# Patient Record
Sex: MALE | Race: BLACK OR AFRICAN AMERICAN | NOT HISPANIC OR LATINO | Employment: FULL TIME | URBAN - METROPOLITAN AREA
[De-identification: names, ages, dates, MRNs, and addresses within clinical notes are randomized per-mention and may not be internally consistent; named-entity substitution may affect disease eponyms.]

---

## 2024-12-29 ENCOUNTER — OFFICE VISIT (OUTPATIENT)
Dept: URGENT CARE | Facility: CLINIC | Age: 59
End: 2024-12-29
Payer: COMMERCIAL

## 2024-12-29 VITALS
OXYGEN SATURATION: 96 % | WEIGHT: 240.4 LBS | HEIGHT: 72 IN | TEMPERATURE: 98.7 F | SYSTOLIC BLOOD PRESSURE: 124 MMHG | HEART RATE: 93 BPM | DIASTOLIC BLOOD PRESSURE: 89 MMHG | BODY MASS INDEX: 32.56 KG/M2

## 2024-12-29 DIAGNOSIS — J06.9 VIRAL UPPER RESPIRATORY TRACT INFECTION: Primary | ICD-10-CM

## 2024-12-29 DIAGNOSIS — R06.02 SOB (SHORTNESS OF BREATH) ON EXERTION: ICD-10-CM

## 2024-12-29 PROBLEM — I26.99 PE (PULMONARY THROMBOEMBOLISM) (MULTI): Status: RESOLVED | Noted: 2024-12-29 | Resolved: 2024-12-29

## 2024-12-29 PROBLEM — G47.33 OSA (OBSTRUCTIVE SLEEP APNEA): Status: ACTIVE | Noted: 2024-12-29

## 2024-12-29 PROBLEM — E83.52 HYPERCALCEMIA: Status: ACTIVE | Noted: 2023-04-07

## 2024-12-29 PROBLEM — E55.9 VITAMIN D DEFICIENCY: Status: ACTIVE | Noted: 2023-06-26

## 2024-12-29 PROBLEM — R26.2 DIFFICULTY WALKING: Status: ACTIVE | Noted: 2021-04-06

## 2024-12-29 PROBLEM — S43.005A DISLOCATION OF LEFT SHOULDER JOINT: Status: RESOLVED | Noted: 2019-01-29 | Resolved: 2024-12-29

## 2024-12-29 PROBLEM — R55 SYNCOPE: Status: RESOLVED | Noted: 2024-12-29 | Resolved: 2024-12-29

## 2024-12-29 PROBLEM — M54.42 ACUTE LEFT-SIDED LOW BACK PAIN WITH LEFT-SIDED SCIATICA: Status: ACTIVE | Noted: 2021-04-06

## 2024-12-29 PROBLEM — R53.81 MALAISE AND FATIGUE: Status: ACTIVE | Noted: 2024-12-29

## 2024-12-29 PROBLEM — M25.612 SHOULDER JOINT STIFFNESS, LEFT: Status: ACTIVE | Noted: 2019-08-29

## 2024-12-29 PROBLEM — R73.03 PREDIABETES: Status: ACTIVE | Noted: 2023-04-07

## 2024-12-29 PROBLEM — E21.0 PRIMARY HYPERPARATHYROIDISM (MULTI): Status: ACTIVE | Noted: 2023-04-07

## 2024-12-29 PROBLEM — R53.83 MALAISE AND FATIGUE: Status: ACTIVE | Noted: 2024-12-29

## 2024-12-29 PROBLEM — I25.10 CAD (CORONARY ARTERY DISEASE): Status: ACTIVE | Noted: 2024-12-29

## 2024-12-29 PROBLEM — Z86.718 PERSONAL HISTORY OF DVT (DEEP VEIN THROMBOSIS): Status: ACTIVE | Noted: 2024-03-27

## 2024-12-29 PROBLEM — I48.91 ATRIAL FIBRILLATION (MULTI): Status: ACTIVE | Noted: 2024-03-27

## 2024-12-29 PROBLEM — R42 DIZZINESS: Status: RESOLVED | Noted: 2024-12-29 | Resolved: 2024-12-29

## 2024-12-29 PROBLEM — M25.612 DECREASED ROM OF LEFT SHOULDER: Status: ACTIVE | Noted: 2019-09-16

## 2024-12-29 PROBLEM — I82.409 DVT (DEEP VENOUS THROMBOSIS) (MULTI): Status: RESOLVED | Noted: 2024-12-29 | Resolved: 2024-12-29

## 2024-12-29 PROBLEM — E66.811 OBESITY, CLASS I, BMI 30-34.9: Status: ACTIVE | Noted: 2024-03-20

## 2024-12-29 PROBLEM — N18.2 CHRONIC KIDNEY DISEASE (CKD), STAGE II (MILD): Status: ACTIVE | Noted: 2024-12-29

## 2024-12-29 PROBLEM — R29.898 DECREASED STRENGTH OF UPPER EXTREMITY: Status: ACTIVE | Noted: 2019-09-16

## 2024-12-29 PROBLEM — E53.8 B12 DEFICIENCY: Status: ACTIVE | Noted: 2024-12-29

## 2024-12-29 PROBLEM — I10 HYPERTENSION: Status: ACTIVE | Noted: 2024-12-29

## 2024-12-29 PROBLEM — R07.9 CHEST PAIN: Status: RESOLVED | Noted: 2024-12-29 | Resolved: 2024-12-29

## 2024-12-29 PROBLEM — R10.9 ABDOMINAL PAIN: Status: RESOLVED | Noted: 2024-12-29 | Resolved: 2024-12-29

## 2024-12-29 PROBLEM — D68.59 HYPERCOAGULABLE STATE (MULTI): Status: ACTIVE | Noted: 2024-12-29

## 2024-12-29 PROBLEM — Z86.711 HISTORY OF PULMONARY EMBOLUS (PE): Status: ACTIVE | Noted: 2024-03-27

## 2024-12-29 LAB
POC RAPID INFLUENZA A: NEGATIVE
POC RAPID INFLUENZA B: NEGATIVE
POC SARS-COV-2 AG BINAX: NORMAL

## 2024-12-29 PROCEDURE — 87811 SARS-COV-2 COVID19 W/OPTIC: CPT

## 2024-12-29 PROCEDURE — 3008F BODY MASS INDEX DOCD: CPT

## 2024-12-29 PROCEDURE — 99214 OFFICE O/P EST MOD 30 MIN: CPT

## 2024-12-29 PROCEDURE — 3079F DIAST BP 80-89 MM HG: CPT

## 2024-12-29 PROCEDURE — 87637 SARSCOV2&INF A&B&RSV AMP PRB: CPT

## 2024-12-29 PROCEDURE — 3074F SYST BP LT 130 MM HG: CPT

## 2024-12-29 PROCEDURE — 87804 INFLUENZA ASSAY W/OPTIC: CPT | Mod: CLIA WAIVED TEST

## 2024-12-29 RX ORDER — FUROSEMIDE 20 MG/1
1 TABLET ORAL
COMMUNITY
Start: 2024-10-28

## 2024-12-29 RX ORDER — BROMPHENIRAMINE MALEATE, PSEUDOEPHEDRINE HYDROCHLORIDE, AND DEXTROMETHORPHAN HYDROBROMIDE 2; 30; 10 MG/5ML; MG/5ML; MG/5ML
10 SYRUP ORAL 4 TIMES DAILY PRN
Qty: 120 ML | Refills: 0 | Status: SHIPPED | OUTPATIENT
Start: 2024-12-29 | End: 2025-01-08

## 2024-12-29 RX ORDER — PANTOPRAZOLE SODIUM 40 MG/1
40 TABLET, DELAYED RELEASE ORAL
COMMUNITY
Start: 2024-09-16

## 2024-12-29 RX ORDER — COLESTIPOL HYDROCHLORIDE 1 G/1
1 TABLET ORAL 2 TIMES DAILY
COMMUNITY
Start: 2024-09-16

## 2024-12-29 RX ORDER — DICYCLOMINE HYDROCHLORIDE 10 MG/1
1 CAPSULE ORAL 3 TIMES DAILY PRN
COMMUNITY
Start: 2024-08-05

## 2024-12-29 RX ORDER — NEEDLES, FILTER 19GX1 1/2"
NEEDLE, DISPOSABLE MISCELLANEOUS
COMMUNITY
Start: 2024-06-16

## 2024-12-29 RX ORDER — ALBUTEROL SULFATE 90 UG/1
2 INHALANT RESPIRATORY (INHALATION) EVERY 4 HOURS PRN
Qty: 8 G | Refills: 0 | Status: SHIPPED | OUTPATIENT
Start: 2024-12-29 | End: 2025-01-28

## 2024-12-29 RX ORDER — APIXABAN 5 MG/1
TABLET, FILM COATED ORAL
COMMUNITY
Start: 2024-10-29

## 2024-12-29 RX ORDER — OXYCODONE HYDROCHLORIDE 5 MG/1
TABLET ORAL
COMMUNITY
Start: 2024-04-24

## 2024-12-29 RX ORDER — AMLODIPINE BESYLATE 10 MG/1
10 TABLET ORAL
COMMUNITY
Start: 2024-09-16

## 2024-12-29 RX ORDER — OSELTAMIVIR PHOSPHATE 75 MG/1
75 CAPSULE ORAL EVERY 12 HOURS
Qty: 10 CAPSULE | Refills: 0 | Status: SHIPPED | OUTPATIENT
Start: 2024-12-29 | End: 2025-01-03

## 2024-12-29 RX ORDER — BENZONATATE 200 MG/1
200 CAPSULE ORAL 3 TIMES DAILY PRN
Qty: 21 CAPSULE | Refills: 0 | Status: SHIPPED | OUTPATIENT
Start: 2024-12-29 | End: 2025-01-05

## 2024-12-29 RX ORDER — METHOCARBAMOL 500 MG/1
1 TABLET, FILM COATED ORAL
COMMUNITY
Start: 2024-11-29

## 2024-12-29 RX ORDER — CALCIUM CARBONATE 200(500)MG
500 TABLET,CHEWABLE ORAL EVERY 8 HOURS PRN
COMMUNITY
Start: 2024-04-24

## 2024-12-29 RX ORDER — CYANOCOBALAMIN 1000 UG/ML
INJECTION, SOLUTION INTRAMUSCULAR; SUBCUTANEOUS
COMMUNITY
Start: 2024-10-28

## 2024-12-29 ASSESSMENT — ENCOUNTER SYMPTOMS
NECK PAIN: 1
WOUND: 0
CHILLS: 1
RHINORRHEA: 1
SHORTNESS OF BREATH: 0
FACIAL SWELLING: 0
SORE THROAT: 1
EYE REDNESS: 0
JOINT SWELLING: 0
FEVER: 1
SINUS PRESSURE: 1
FATIGUE: 1
WEAKNESS: 1
DECREASED PHYSICAL ACTIVITY: 1
FLU SYMPTOMS: 1
HEADACHES: 1
COUGH: 1
GASTROINTESTINAL NEGATIVE: 1
WHEEZING: 0
MYALGIAS: 1
ARTHRALGIAS: 1
NAUSEA: 0
CHEST TIGHTNESS: 0
TROUBLE SWALLOWING: 0
CARDIOVASCULAR NEGATIVE: 1
PALPITATIONS: 0
DIZZINESS: 0
COLOR CHANGE: 0
VOICE CHANGE: 0
DIARRHEA: 0
ABDOMINAL PAIN: 0
DECREASED APPETITE: 1
VOMITING: 0
DIAPHORESIS: 1

## 2024-12-29 ASSESSMENT — VISUAL ACUITY: OU: 1

## 2024-12-29 NOTE — PROGRESS NOTES
Subjective   History  Stas Lopez is a 59 y.o. male who presents for Flu Symptoms.    Patient reports sore throat, cough, body aches, fatigue, and headache for the last 3 days. He reports trying robitussin with minimal relief. Patient reports that they have not done any at home COVID testing during this illness.       History provided by:  Medical records and patient  Flu Symptoms  Presenting symptoms: cough, fatigue, fever, headache, myalgias, rhinorrhea and sore throat    Presenting symptoms: no diarrhea, no nausea, no shortness of breath and no vomiting    Associated symptoms: chills, decreased appetite, decreased physical activity and nasal congestion    Associated symptoms: no ear pain and no syncope    Risk factors: heart disease and kidney disease    Risk factors: no diabetes problem      No past surgical history on file.       Review of Systems   Review of Systems   Constitutional:  Positive for chills, decreased appetite, diaphoresis, fatigue and fever.   HENT:  Positive for congestion, postnasal drip, rhinorrhea, sinus pressure, sneezing and sore throat. Negative for ear pain, facial swelling, trouble swallowing and voice change.    Eyes:  Negative for redness.   Respiratory:  Positive for cough. Negative for chest tightness, shortness of breath and wheezing.    Cardiovascular: Negative.  Negative for chest pain and palpitations.   Gastrointestinal: Negative.  Negative for abdominal pain, diarrhea, nausea and vomiting.   Musculoskeletal:  Positive for arthralgias, myalgias and neck pain. Negative for joint swelling.   Skin: Negative.  Negative for color change, rash and wound.   Neurological:  Positive for weakness and headaches. Negative for dizziness.       Objective   Vital Signs  /89 (BP Location: Left arm, Patient Position: Sitting)   Pulse 93   Temp 37.1 °C (98.7 °F) (Oral)   Ht 1.829 m (6')   Wt 109 kg (240 lb 6.4 oz)   SpO2 96%   BMI 32.60 kg/m²    All vitals have been reviewed and  are stable.     Physical Exam  Physical Exam  Vitals and nursing note reviewed.   Constitutional:       General: He is not in acute distress.     Appearance: Normal appearance. He is ill-appearing.   HENT:      Head: Normocephalic and atraumatic. No right periorbital erythema or left periorbital erythema.      Jaw: There is normal jaw occlusion.      Right Ear: External ear normal.      Left Ear: External ear normal.      Nose: Mucosal edema, congestion and rhinorrhea present.      Mouth/Throat:      Lips: Pink. No lesions.      Mouth: Mucous membranes are moist.      Palate: No lesions.      Pharynx: Uvula midline. Posterior oropharyngeal erythema and postnasal drip present. No uvula swelling.      Tonsils: No tonsillar exudate.   Eyes:      General: Lids are normal. Vision grossly intact.         Right eye: No discharge.         Left eye: No discharge.      Extraocular Movements: Extraocular movements intact.      Conjunctiva/sclera: Conjunctivae normal.      Right eye: Right conjunctiva is not injected.      Left eye: Left conjunctiva is not injected.      Pupils: Pupils are equal, round, and reactive to light.   Neck:      Trachea: Trachea normal.      Meningeal: Brudzinski's sign absent.   Cardiovascular:      Rate and Rhythm: Normal rate and regular rhythm.      Heart sounds: Normal heart sounds.   Pulmonary:      Effort: Pulmonary effort is normal. No tachypnea, accessory muscle usage or respiratory distress.      Breath sounds: Normal breath sounds and air entry. Transmitted upper airway sounds present. No stridor. No wheezing, rhonchi or rales.   Abdominal:      General: Abdomen is flat.      Palpations: Abdomen is soft.   Musculoskeletal:         General: Normal range of motion.      Cervical back: Full passive range of motion without pain, normal range of motion and neck supple.   Lymphadenopathy:      Cervical: No cervical adenopathy.   Skin:     General: Skin is warm and dry.      Coloration: Skin is  not pale or sallow.      Findings: No erythema, petechiae or rash.   Neurological:      General: No focal deficit present.      Mental Status: He is alert and oriented to person, place, and time. Mental status is at baseline.   Psychiatric:         Mood and Affect: Mood normal.         Behavior: Behavior normal.         Diagnostic Results   No results found for this or any previous visit (from the past 48 hours).      Assessment/Plan   Procedures   N/A    Problem List Items Addressed This Visit    None  Visit Diagnoses       Viral upper respiratory tract infection    -  Primary    Relevant Medications    Benzonatate 200mg   Bromfed syrup  albuterol 90 mcg/actuation inhaler    Other Relevant Orders    POCT BinaxNOW Covid-19 Ag Card manually resulted (Completed)    POCT Influenza A/B manually resulted (Completed)    Sars-CoV-2 and Influenza A/B PCR (Completed)    RSV PCR (Completed)    SOB (shortness of breath) on exertion        Relevant Medications    albuterol 90 mcg/actuation inhaler    Other Relevant Orders    Sars-CoV-2 and Influenza A/B PCR (Completed)    RSV PCR (Completed)            UC Course  Patient disposition: Home    Red flags for reporting to ER have been reviewed with the patient.    Current diagnosis, any medication changes, and all in-office lab or radiologic results have been reviewed with the patient at the time of the visit.   If symptoms do not improve or worsen, patient is to follow up with PCP or report to the emergency room.   Patient is alert and oriented x3 and non-toxic appearing. Vital signs are stable.   Patient and/or guardian has sufficient decision-making capabilities at this time and reports understanding and agreement with the treatment plan made through shared decision-making.

## 2024-12-30 LAB
FLUAV RNA RESP QL NAA+PROBE: NOT DETECTED
FLUBV RNA RESP QL NAA+PROBE: NOT DETECTED
RSV RNA RESP QL NAA+PROBE: NOT DETECTED
SARS-COV-2 ORF1AB RESP QL NAA+PROBE: NOT DETECTED

## 2025-06-20 ENCOUNTER — OFFICE VISIT (OUTPATIENT)
Facility: CLINIC | Age: 60
End: 2025-06-20
Payer: COMMERCIAL

## 2025-06-20 VITALS
DIASTOLIC BLOOD PRESSURE: 84 MMHG | HEART RATE: 79 BPM | SYSTOLIC BLOOD PRESSURE: 150 MMHG | RESPIRATION RATE: 20 BRPM | OXYGEN SATURATION: 96 %

## 2025-06-20 DIAGNOSIS — R07.9 LEFT-SIDED CHEST PAIN: ICD-10-CM

## 2025-06-20 DIAGNOSIS — J22 LOWER RESPIRATORY INFECTION: ICD-10-CM

## 2025-06-20 DIAGNOSIS — R09.89 RALES: Primary | ICD-10-CM

## 2025-06-20 RX ORDER — PREDNISONE 20 MG/1
TABLET ORAL
Qty: 12 TABLET | Refills: 0 | Status: SHIPPED | OUTPATIENT
Start: 2025-06-20 | End: 2025-06-26

## 2025-06-20 RX ORDER — AZITHROMYCIN 250 MG/1
TABLET, FILM COATED ORAL
Qty: 6 TABLET | Refills: 0 | Status: SHIPPED | OUTPATIENT
Start: 2025-06-20

## 2025-06-20 RX ORDER — IPRATROPIUM BROMIDE AND ALBUTEROL SULFATE 2.5; .5 MG/3ML; MG/3ML
3 SOLUTION RESPIRATORY (INHALATION) ONCE
Status: COMPLETED | OUTPATIENT
Start: 2025-06-20 | End: 2025-06-20

## 2025-06-20 RX ADMIN — IPRATROPIUM BROMIDE AND ALBUTEROL SULFATE 3 ML: 2.5; .5 SOLUTION RESPIRATORY (INHALATION) at 14:35

## 2025-06-20 NOTE — PROGRESS NOTES
Subjective   Patient ID: Stas Lopez is a 59 y.o. male who presents for Shortness of Breath.    Pt presents today c/o left-sided chest pain and SOB. Does have Hx of A. Fib. He had something similar a few months ago, was given a breathing treatment, Zpak and prednisone with improvement. He is suppressing his cough which is causing him to feel more SOB - has pain with coughing. Denies: fever, chills, aches, headache, dizziness, abdominal pain, N/V/D, rash, swelling, bruising, ear pain. Admits to some pain with swallowing.         Review of Systems   All other systems reviewed and are negative.      Objective   /84 (BP Location: Right arm, Patient Position: Sitting)   Pulse 79   Resp 20   SpO2 96%     Physical Exam  Vitals reviewed.   Constitutional:       General: He is awake.      Appearance: Normal appearance. He is well-developed.   HENT:      Head: Normocephalic and atraumatic.      Right Ear: Tympanic membrane and ear canal normal.      Left Ear: Tympanic membrane and ear canal normal.      Nose: Nose normal.      Mouth/Throat:      Lips: Pink.      Mouth: Mucous membranes are moist.      Pharynx: Uvula midline. Posterior oropharyngeal erythema present.   Cardiovascular:      Rate and Rhythm: Normal rate and regular rhythm.   Pulmonary:      Effort: Pulmonary effort is normal. Tachypnea present.      Breath sounds: Decreased air movement present. Examination of the left-lower field reveals rales. Decreased breath sounds and rales present. No wheezing or rhonchi.   Chest:      Chest wall: No tenderness.   Musculoskeletal:      Cervical back: Full passive range of motion without pain.      Right lower leg: No edema.      Left lower leg: No edema.   Skin:     General: Skin is warm and dry.      Findings: No lesion or rash.   Neurological:      General: No focal deficit present.      Mental Status: He is alert and oriented to person, place, and time.      Cranial Nerves: No facial asymmetry.      Motor:  Motor function is intact.      Gait: Gait is intact.   Psychiatric:         Attention and Perception: Attention normal.         Mood and Affect: Mood and affect normal.       Assessment/Plan   Problem List Items Addressed This Visit    None  Visit Diagnoses         Codes      Rales    -  Primary R09.89    Relevant Orders    XR chest 2 views      Left-sided chest pain     R07.9    Relevant Orders    XR chest 2 views    ECG 12 lead (Clinic Performed)      Lower respiratory infection     J22    Relevant Medications    azithromycin (Zithromax) 250 mg tablet    predniSONE (Deltasone) 20 mg tablet    ipratropium-albuteroL (Duo-Neb) 0.5-2.5 mg/3 mL nebulizer solution 3 mL (Completed)          DuoNeb treatment administered with improvement in Sx ; tachypnea resolved  EKG done- no evidence of cardiac etiology   COVID test negative here in office  VS stable  Suspect PNA based on exam findings - CXR ordered, instructed pt to get this done ASAP   Increase rest and fluids  Should go to ER or call 911 with any worsening Sx     Patient or guardian verbalized understanding and agreement with care plan as above. All in office testing reviewed with patient/guardian. If symptoms worsen or do not improve, patient is to follow up with PCP or report to the ER.

## 2025-06-20 NOTE — PATIENT INSTRUCTIONS
A DuoNeb breathing treatment was administered with some improvement of your breathing  COVID test was negative   An EKG was done - did not show any evidence of acute A. Fib or heart attack  Your vitals were stable here today upon leaving   Please get your CXR done ASAP   We have called in Zithromax (Zpak) and prednisone to the pharmacy   If any continued or worsening symptoms, please call 911 or report to ER right away

## 2025-06-20 NOTE — LETTER
June 20, 2025     Patient: Stas Lopez   YOB: 1965   Date of Visit: 6/20/2025       To Whom It May Concern:    Stas Lopez was seen in my clinic on 6/20/2025 at 11:40 am. Please excuse Stas for his absence from work on this day to make the appointment.    If you have any questions or concerns, please don't hesitate to call.         Sincerely,         Lesa Elmore PA-C        CC: No Recipients